# Patient Record
Sex: MALE | Race: WHITE | Employment: STUDENT | URBAN - NONMETROPOLITAN AREA
[De-identification: names, ages, dates, MRNs, and addresses within clinical notes are randomized per-mention and may not be internally consistent; named-entity substitution may affect disease eponyms.]

---

## 2019-04-05 ENCOUNTER — HOSPITAL ENCOUNTER (EMERGENCY)
Age: 19
Discharge: HOME | End: 2019-04-05
Payer: COMMERCIAL

## 2019-04-05 VITALS
HEART RATE: 80 BPM | WEIGHT: 185 LBS | BODY MASS INDEX: 22.53 KG/M2 | SYSTOLIC BLOOD PRESSURE: 148 MMHG | RESPIRATION RATE: 16 BRPM | OXYGEN SATURATION: 99 % | TEMPERATURE: 97.5 F | HEIGHT: 76 IN | DIASTOLIC BLOOD PRESSURE: 70 MMHG

## 2019-04-05 DIAGNOSIS — Z11.3 SCREENING EXAMINATION FOR STD (SEXUALLY TRANSMITTED DISEASE): Primary | ICD-10-CM

## 2019-04-05 LAB
CHLAMYDIA TRACHOMATIS BY RT-PCR: NOT DETECTED
CT/NG SOURCE: NORMAL
NEISSERIA GONORRHOEAE BY RT-PCR: NOT DETECTED

## 2019-04-05 PROCEDURE — 99202 OFFICE O/P NEW SF 15 MIN: CPT | Performed by: NURSE PRACTITIONER

## 2019-04-05 PROCEDURE — 87591 N.GONORRHOEAE DNA AMP PROB: CPT

## 2019-04-05 PROCEDURE — 87491 CHLMYD TRACH DNA AMP PROBE: CPT

## 2019-04-05 PROCEDURE — 99213 OFFICE O/P EST LOW 20 MIN: CPT

## 2019-04-05 ASSESSMENT — ENCOUNTER SYMPTOMS
BACK PAIN: 0
ABDOMINAL PAIN: 0

## 2019-04-05 NOTE — ED PROVIDER NOTES
[facility]       CHIEF COMPLAINT       Chief Complaint   Patient presents with    Sexually Transmitted Diseases     Patient states he wants checked for STD       Nurses Notes reviewed and I agree except as noted in the HPI. HISTORY OF PRESENT ILLNESS   Chino Aguayo is a 22 y.o. male who presents to the urgent care center stating that he would like to be checked for any STDs. The patient states that he attends a local college and has been sexually active but does wear a condom. The patient states that he would like to be tested. The patient denies any symptoms at the present time. The patient denies any burning, frequency, penile discharge, abdominal pain or dysuria. Patient denies any open sores lesions or. The history is provided by the patient. No  was used. REVIEW OF SYSTEMS     Review of Systems   Constitutional: Negative for chills and fever. Gastrointestinal: Negative for abdominal pain. Genitourinary: Negative for decreased urine volume, difficulty urinating, discharge, dysuria, frequency, genital sores, penile pain, penile swelling, scrotal swelling, testicular pain and urgency. Musculoskeletal: Negative for back pain. PAST MEDICAL HISTORY   History reviewed. No pertinent past medical history. SURGICALHISTORY     Patient  has no past surgical history on file. CURRENT MEDICATIONS       There are no discharge medications for this patient. ALLERGIES     Patient is is allergic to pcn [penicillins]. Patients   There is no immunization history on file for this patient. FAMILY HISTORY     Patient's family history is not on file. SOCIAL HISTORY     Patient  reports that he has never smoked. He has never used smokeless tobacco. He reports that he drinks alcohol. He reports that he has current or past drug history. Drug: Marijuana.     PHYSICAL EXAM     ED TRIAGE VITALS  BP: (!) 148/70, Temp: 97.5 °F (36.4 °C), Heart Rate: 80, Resp: 16, SpO2: 99 %,Estimated body mass index is 22.52 kg/m² as calculated from the following:    Height as of this encounter: 6' 4\" (1.93 m). Weight as of this encounter: 185 lb (83.9 kg). ,No LMP for male patient. Physical Exam   Constitutional: He is oriented to person, place, and time. Vital signs are normal. He appears well-developed and well-nourished. He is cooperative. Non-toxic appearance. He does not have a sickly appearance. He does not appear ill. No distress. HENT:   Head: Normocephalic and atraumatic. Cardiovascular: Normal rate. Pulmonary/Chest: Effort normal.   Neurological: He is alert and oriented to person, place, and time. Skin: Skin is warm and dry. Capillary refill takes less than 2 seconds. He is not diaphoretic. Nursing note and vitals reviewed. DIAGNOSTIC RESULTS     Labs:No results found for this visit on 04/05/19. IMAGING:    No orders to display         EKG:      URGENT CARE COURSE:     Vitals:    04/05/19 1637   BP: (!) 148/70   Pulse: 80   Resp: 16   Temp: 97.5 °F (36.4 °C)   SpO2: 99%   Weight: 185 lb (83.9 kg)   Height: (!) 6' 4\" (1.93 m)       Medications - No data to display         PROCEDURES:  None    FINAL IMPRESSION      1. Screening examination for STD (sexually transmitted disease)          DISPOSITION/ PLAN      No sex x 7 days  Use protection  Notify partners  Monitor for any changes such as rash, lesions, or urinary issues  Follow up with PCP x 1 week  Go to ED for any changes  For Culture Results  Call in 3-5 days after your visit. To obtain results.     FOR MORE INFORMATION ABOUT STDS, CONTACT YOUR PHYSICIAN OR:    [address] [facility]  [facility]     Address: [place] [address] [address], [address]   [phone]  Regarding syphilis and HIV testing as per [facility] guidelines  [facility] AIDS Hotline     [phone]                    PATIENT REFERRED TO:  No primary care provider on file.  No primary physician on file. DISCHARGE MEDICATIONS:  There are no discharge medications for this patient. There are no discharge medications for this patient. There are no discharge medications for this patient.       DARCIE Moya CNP    (Please note that portions of this note were completed with a voice recognition program. Efforts were made to edit the dictations but occasionally words are mis-transcribed.)           DARCIE Moya CNP  04/05/19 2457

## 2019-04-08 ENCOUNTER — HOSPITAL ENCOUNTER (EMERGENCY)
Age: 19
Discharge: HOME | End: 2019-04-08
Payer: COMMERCIAL

## 2019-04-08 VITALS
WEIGHT: 185 LBS | SYSTOLIC BLOOD PRESSURE: 128 MMHG | HEART RATE: 75 BPM | DIASTOLIC BLOOD PRESSURE: 75 MMHG | OXYGEN SATURATION: 98 % | BODY MASS INDEX: 22.52 KG/M2 | TEMPERATURE: 98.8 F | RESPIRATION RATE: 16 BRPM

## 2019-04-08 DIAGNOSIS — J06.9 ACUTE UPPER RESPIRATORY INFECTION: Primary | ICD-10-CM

## 2019-04-08 LAB
FLU A ANTIGEN: NEGATIVE
FLU B ANTIGEN: NEGATIVE

## 2019-04-08 PROCEDURE — 99213 OFFICE O/P EST LOW 20 MIN: CPT | Performed by: NURSE PRACTITIONER

## 2019-04-08 PROCEDURE — 99213 OFFICE O/P EST LOW 20 MIN: CPT

## 2019-04-08 PROCEDURE — 87804 INFLUENZA ASSAY W/OPTIC: CPT

## 2019-04-08 RX ORDER — GUAIFENESIN 600 MG/1
1200 TABLET, EXTENDED RELEASE ORAL 2 TIMES DAILY
Qty: 40 TABLET | Refills: 0 | Status: SHIPPED | OUTPATIENT
Start: 2019-04-08 | End: 2019-04-18

## 2019-04-08 RX ORDER — LORATADINE 10 MG/1
10 TABLET ORAL DAILY
Qty: 30 TABLET | Refills: 0 | Status: SHIPPED | OUTPATIENT
Start: 2019-04-08

## 2019-04-08 RX ORDER — DEXTROMETHORPHAN HYDROBROMIDE AND PROMETHAZINE HYDROCHLORIDE 15; 6.25 MG/5ML; MG/5ML
5 SYRUP ORAL 4 TIMES DAILY PRN
Qty: 120 ML | Refills: 0 | Status: SHIPPED | OUTPATIENT
Start: 2019-04-08 | End: 2019-04-15

## 2019-04-08 RX ORDER — FLUTICASONE PROPIONATE 50 MCG
1 SPRAY, SUSPENSION (ML) NASAL DAILY
Qty: 1 BOTTLE | Refills: 0 | Status: SHIPPED | OUTPATIENT
Start: 2019-04-08 | End: 2019-04-15

## 2019-04-08 RX ORDER — AZITHROMYCIN 250 MG/1
TABLET, FILM COATED ORAL
Qty: 6 TABLET | Refills: 0 | Status: SHIPPED | OUTPATIENT
Start: 2019-04-08

## 2019-04-08 ASSESSMENT — PAIN SCALES - GENERAL: PAINLEVEL_OUTOF10: 5

## 2019-04-08 ASSESSMENT — ENCOUNTER SYMPTOMS
ABDOMINAL DISTENTION: 0
EYE ITCHING: 0
RHINORRHEA: 1
EYE DISCHARGE: 0
SORE THROAT: 1
DIARRHEA: 0
COLOR CHANGE: 0
VOMITING: 0
SINUS PRESSURE: 1
COUGH: 1
SHORTNESS OF BREATH: 0
SINUS PAIN: 0
NAUSEA: 0

## 2019-04-08 ASSESSMENT — PAIN DESCRIPTION - PAIN TYPE: TYPE: ACUTE PAIN

## 2019-04-08 ASSESSMENT — PAIN DESCRIPTION - DESCRIPTORS: DESCRIPTORS: ACHING

## 2019-04-08 ASSESSMENT — PAIN DESCRIPTION - FREQUENCY: FREQUENCY: CONTINUOUS

## 2019-04-08 ASSESSMENT — PAIN - FUNCTIONAL ASSESSMENT: PAIN_FUNCTIONAL_ASSESSMENT: PREVENTS OR INTERFERES SOME ACTIVE ACTIVITIES AND ADLS

## 2019-04-08 ASSESSMENT — PAIN DESCRIPTION - LOCATION: LOCATION: HEAD

## 2019-04-08 NOTE — ED PROVIDER NOTES
he has current or past drug history. Drug: Marijuana. PHYSICAL EXAM     ED TRIAGE VITALS  BP: 128/75, Temp: 98.8 °F (37.1 °C), Heart Rate: 75, Resp: 16, SpO2: 98 %,Estimated body mass index is 22.52 kg/m² as calculated from the following:    Height as of 4/5/19: 6' 4\" (1.93 m). Weight as of this encounter: 185 lb (83.9 kg). ,No LMP for male patient. Physical Exam   Constitutional: He is oriented to person, place, and time. Vital signs are normal. He appears well-developed and well-nourished. He is active and cooperative. Non-toxic appearance. He does not have a sickly appearance. He appears ill. No distress. HENT:   Head: Normocephalic and atraumatic. Right Ear: Hearing, external ear and ear canal normal. A middle ear effusion is present. Left Ear: Hearing, external ear and ear canal normal. A middle ear effusion is present. Nose: Rhinorrhea present. Right sinus exhibits no maxillary sinus tenderness and no frontal sinus tenderness. Left sinus exhibits no maxillary sinus tenderness and no frontal sinus tenderness. Mouth/Throat: Uvula is midline and mucous membranes are normal. Posterior oropharyngeal erythema present. Cardiovascular: Normal rate and regular rhythm. Pulmonary/Chest: Effort normal and breath sounds normal.   Lymphadenopathy:        Head (right side): No submental, no submandibular, no tonsillar, no preauricular, no posterior auricular and no occipital adenopathy present. Head (left side): No submental, no submandibular, no tonsillar, no preauricular, no posterior auricular and no occipital adenopathy present. He has no cervical adenopathy. He has no axillary adenopathy. Neurological: He is alert and oriented to person, place, and time. Skin: Skin is warm, dry and intact. No rash noted. Nursing note and vitals reviewed.       DIAGNOSTIC RESULTS     Labs:  Results for orders placed or performed during the hospital encounter of 04/08/19   Rapid influenza A/B antigens   Result Value Ref Range    Flu A Antigen NEGATIVE NEGATIVE    Flu B Antigen NEGATIVE NEGATIVE       IMAGING:    No orders to display         EKG:      URGENT CARE COURSE:     Vitals:    04/08/19 1632   BP: 128/75   Pulse: 75   Resp: 16   Temp: 98.8 °F (37.1 °C)   SpO2: 98%   Weight: 185 lb (83.9 kg)       Medications - No data to display         PROCEDURES:  None    FINAL IMPRESSION      1. Acute upper respiratory infection          DISPOSITION/ PLAN     The patient's physical exam is consistent with acute upper respiratory infection. He was treated with azithromycin, Flonase, Mucinex, Claritin, promethazine DM. They are to take the medication as prescribed. The patient is to follow-up with their PCP in 2-3 days. They're to go the ER for any worsening symptoms. The patient was agreeable to this plan of care and voiced no concerns at time of discharge. They were ambulatory out of the department in stable condition. PATIENT REFERRED TO:  No primary care provider on file. No primary physician on file. DISCHARGE MEDICATIONS:  New Prescriptions    AZITHROMYCIN (ZITHROMAX) 250 MG TABLET    Take 2 tablets on day 1. Take 1 tablet on days 2 through 5.     FLUTICASONE (FLONASE) 50 MCG/ACT NASAL SPRAY    1 spray by Nasal route daily for 7 days    GUAIFENESIN (MUCINEX) 600 MG EXTENDED RELEASE TABLET    Take 2 tablets by mouth 2 times daily for 10 days    LORATADINE (CLARITIN) 10 MG TABLET    Take 1 tablet by mouth daily    PROMETHAZINE-DEXTROMETHORPHAN (PROMETHAZINE-DM) 6.25-15 MG/5ML SYRUP    Take 5 mLs by mouth 4 times daily as needed for Cough       Discontinued Medications    No medications on file       Current Discharge Medication List          Chayo Dixie, APRN - CNP    (Please note that portions of this note were completed with a voice recognition program. Efforts were made to edit the dictations but occasionally words are mis-transcribed.)            DARCIE Harmon CNP  04/08/19 [id number]